# Patient Record
Sex: MALE | Race: WHITE
[De-identification: names, ages, dates, MRNs, and addresses within clinical notes are randomized per-mention and may not be internally consistent; named-entity substitution may affect disease eponyms.]

---

## 2018-12-17 ENCOUNTER — HOSPITAL ENCOUNTER (EMERGENCY)
Dept: HOSPITAL 91 - FTE | Age: 28
LOS: 1 days | Discharge: HOME | End: 2018-12-18
Payer: SELF-PAY

## 2018-12-17 ENCOUNTER — HOSPITAL ENCOUNTER (EMERGENCY)
Age: 28
LOS: 1 days | Discharge: HOME | End: 2018-12-18

## 2018-12-17 DIAGNOSIS — S80.12XA: ICD-10-CM

## 2018-12-17 DIAGNOSIS — S40.022A: Primary | ICD-10-CM
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
## 2018-12-17 PROCEDURE — 73510: CPT

## 2018-12-17 PROCEDURE — 99284 EMERGENCY DEPT VISIT MOD MDM: CPT

## 2018-12-17 PROCEDURE — 73030 X-RAY EXAM OF SHOULDER: CPT

## 2018-12-18 RX ADMIN — IBUPROFEN 1 MG: 600 TABLET ORAL at 01:28

## 2018-12-18 RX ADMIN — HYDROCODONE BITARTRATE AND ACETAMINOPHEN 1 TAB: 5; 325 TABLET ORAL at 01:29

## 2019-08-14 ENCOUNTER — HOSPITAL ENCOUNTER (EMERGENCY)
Dept: HOSPITAL 54 - ER | Age: 29
Discharge: HOME | End: 2019-08-14
Payer: SELF-PAY

## 2019-08-14 VITALS — DIASTOLIC BLOOD PRESSURE: 80 MMHG | SYSTOLIC BLOOD PRESSURE: 139 MMHG

## 2019-08-14 VITALS — BODY MASS INDEX: 23.32 KG/M2 | WEIGHT: 140 LBS | HEIGHT: 65 IN

## 2019-08-14 DIAGNOSIS — M54.2: Primary | ICD-10-CM
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
## 2019-08-14 DIAGNOSIS — M79.661: ICD-10-CM
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
## 2019-08-14 DIAGNOSIS — R07.89: ICD-10-CM
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
